# Patient Record
Sex: MALE | Race: WHITE | NOT HISPANIC OR LATINO
[De-identification: names, ages, dates, MRNs, and addresses within clinical notes are randomized per-mention and may not be internally consistent; named-entity substitution may affect disease eponyms.]

---

## 2017-01-20 ENCOUNTER — RX RENEWAL (OUTPATIENT)
Age: 47
End: 2017-01-20

## 2017-04-04 ENCOUNTER — APPOINTMENT (OUTPATIENT)
Dept: RHEUMATOLOGY | Facility: CLINIC | Age: 47
End: 2017-04-04

## 2017-05-05 ENCOUNTER — RX RENEWAL (OUTPATIENT)
Age: 47
End: 2017-05-05

## 2017-06-12 ENCOUNTER — RX RENEWAL (OUTPATIENT)
Age: 47
End: 2017-06-12

## 2017-07-21 ENCOUNTER — APPOINTMENT (OUTPATIENT)
Dept: RHEUMATOLOGY | Facility: CLINIC | Age: 47
End: 2017-07-21

## 2017-08-24 ENCOUNTER — APPOINTMENT (OUTPATIENT)
Dept: NEUROSURGERY | Facility: CLINIC | Age: 47
End: 2017-08-24
Payer: COMMERCIAL

## 2017-08-24 DIAGNOSIS — R09.81 NASAL CONGESTION: ICD-10-CM

## 2017-08-24 PROCEDURE — 99203 OFFICE O/P NEW LOW 30 MIN: CPT

## 2017-09-01 PROBLEM — R09.81 CONGESTION OF NASAL SINUS: Status: ACTIVE | Noted: 2017-08-24

## 2017-10-30 ENCOUNTER — FORM ENCOUNTER (OUTPATIENT)
Age: 47
End: 2017-10-30

## 2017-10-31 ENCOUNTER — APPOINTMENT (OUTPATIENT)
Dept: MRI IMAGING | Facility: CLINIC | Age: 47
End: 2017-10-31
Payer: COMMERCIAL

## 2017-10-31 ENCOUNTER — OUTPATIENT (OUTPATIENT)
Dept: OUTPATIENT SERVICES | Facility: HOSPITAL | Age: 47
LOS: 1 days | End: 2017-10-31
Payer: COMMERCIAL

## 2017-10-31 DIAGNOSIS — Z00.8 ENCOUNTER FOR OTHER GENERAL EXAMINATION: ICD-10-CM

## 2017-10-31 PROCEDURE — 72148 MRI LUMBAR SPINE W/O DYE: CPT | Mod: 26

## 2017-10-31 PROCEDURE — 72141 MRI NECK SPINE W/O DYE: CPT | Mod: 26

## 2017-10-31 PROCEDURE — 72148 MRI LUMBAR SPINE W/O DYE: CPT

## 2017-10-31 PROCEDURE — 72141 MRI NECK SPINE W/O DYE: CPT

## 2017-12-12 ENCOUNTER — APPOINTMENT (OUTPATIENT)
Dept: NEUROSURGERY | Facility: CLINIC | Age: 47
End: 2017-12-12
Payer: COMMERCIAL

## 2017-12-12 VITALS
BODY MASS INDEX: 25.77 KG/M2 | DIASTOLIC BLOOD PRESSURE: 91 MMHG | WEIGHT: 180 LBS | SYSTOLIC BLOOD PRESSURE: 134 MMHG | RESPIRATION RATE: 16 BRPM | TEMPERATURE: 98 F | HEART RATE: 76 BPM | HEIGHT: 70 IN

## 2017-12-12 DIAGNOSIS — M54.41 LUMBAGO WITH SCIATICA, LEFT SIDE: ICD-10-CM

## 2017-12-12 DIAGNOSIS — M54.42 LUMBAGO WITH SCIATICA, LEFT SIDE: ICD-10-CM

## 2017-12-12 DIAGNOSIS — M51.16 INTERVERTEBRAL DISC DISORDERS WITH RADICULOPATHY, LUMBAR REGION: ICD-10-CM

## 2017-12-12 PROCEDURE — 99214 OFFICE O/P EST MOD 30 MIN: CPT

## 2017-12-22 PROBLEM — M51.16 LUMBAR DISC PROLAPSE WITH COMPRESSION RADICULOPATHY: Status: ACTIVE | Noted: 2017-12-22

## 2018-06-07 ENCOUNTER — APPOINTMENT (OUTPATIENT)
Dept: RHEUMATOLOGY | Facility: CLINIC | Age: 48
End: 2018-06-07
Payer: COMMERCIAL

## 2018-06-07 VITALS
HEIGHT: 70 IN | BODY MASS INDEX: 25.05 KG/M2 | OXYGEN SATURATION: 98 % | HEART RATE: 72 BPM | SYSTOLIC BLOOD PRESSURE: 126 MMHG | DIASTOLIC BLOOD PRESSURE: 80 MMHG | WEIGHT: 175 LBS

## 2018-06-07 DIAGNOSIS — G56.03 CARPAL TUNNEL SYNDROM,BILATERAL UPPER LIMBS: ICD-10-CM

## 2018-06-07 DIAGNOSIS — E78.00 PURE HYPERCHOLESTEROLEMIA, UNSPECIFIED: ICD-10-CM

## 2018-06-07 DIAGNOSIS — S16.1XXA STRAIN OF MUSCLE, FASCIA AND TENDON AT NECK LEVEL, INITIAL ENCOUNTER: ICD-10-CM

## 2018-06-07 DIAGNOSIS — E55.9 VITAMIN D DEFICIENCY, UNSPECIFIED: ICD-10-CM

## 2018-06-07 PROCEDURE — 99214 OFFICE O/P EST MOD 30 MIN: CPT

## 2018-06-07 RX ORDER — AMLODIPINE BESYLATE 5 MG/1
5 TABLET ORAL
Qty: 30 | Refills: 0 | Status: ACTIVE | COMMUNITY
Start: 2017-10-25

## 2018-07-17 ENCOUNTER — APPOINTMENT (OUTPATIENT)
Dept: RHEUMATOLOGY | Facility: CLINIC | Age: 48
End: 2018-07-17

## 2018-10-23 ENCOUNTER — APPOINTMENT (OUTPATIENT)
Dept: RHEUMATOLOGY | Facility: CLINIC | Age: 48
End: 2018-10-23
Payer: COMMERCIAL

## 2018-10-23 VITALS
DIASTOLIC BLOOD PRESSURE: 91 MMHG | HEART RATE: 69 BPM | OXYGEN SATURATION: 99 % | BODY MASS INDEX: 24.34 KG/M2 | WEIGHT: 170 LBS | SYSTOLIC BLOOD PRESSURE: 143 MMHG | HEIGHT: 70 IN

## 2018-10-23 DIAGNOSIS — G47.30 SLEEP APNEA, UNSPECIFIED: ICD-10-CM

## 2018-10-23 DIAGNOSIS — R74.8 ABNORMAL LEVELS OF OTHER SERUM ENZYMES: ICD-10-CM

## 2018-10-23 PROCEDURE — 99214 OFFICE O/P EST MOD 30 MIN: CPT

## 2018-10-23 RX ORDER — TIZANIDINE 4 MG/1
4 TABLET ORAL
Qty: 90 | Refills: 0 | Status: DISCONTINUED | COMMUNITY
Start: 2018-06-07 | End: 2018-10-23

## 2018-10-23 RX ORDER — NAPROXEN 500 MG/1
500 TABLET ORAL
Qty: 14 | Refills: 0 | Status: DISCONTINUED | COMMUNITY
Start: 2018-06-04 | End: 2018-10-23

## 2018-10-24 LAB — CK SERPL-CCNC: 371 U/L

## 2018-10-25 LAB — HLA-B27 RELATED AG QL: NORMAL

## 2018-10-30 ENCOUNTER — CLINICAL ADVICE (OUTPATIENT)
Age: 48
End: 2018-10-30

## 2018-11-09 ENCOUNTER — FORM ENCOUNTER (OUTPATIENT)
Age: 48
End: 2018-11-09

## 2018-11-10 ENCOUNTER — APPOINTMENT (OUTPATIENT)
Dept: RADIOLOGY | Facility: CLINIC | Age: 48
End: 2018-11-10
Payer: COMMERCIAL

## 2018-11-10 ENCOUNTER — OUTPATIENT (OUTPATIENT)
Dept: OUTPATIENT SERVICES | Facility: HOSPITAL | Age: 48
LOS: 1 days | End: 2018-11-10
Payer: COMMERCIAL

## 2018-11-10 DIAGNOSIS — Z00.8 ENCOUNTER FOR OTHER GENERAL EXAMINATION: ICD-10-CM

## 2018-11-10 PROCEDURE — 73030 X-RAY EXAM OF SHOULDER: CPT

## 2018-11-10 PROCEDURE — 73030 X-RAY EXAM OF SHOULDER: CPT | Mod: 26,50

## 2018-11-10 PROCEDURE — 73564 X-RAY EXAM KNEE 4 OR MORE: CPT | Mod: 26,50

## 2018-11-10 PROCEDURE — 73564 X-RAY EXAM KNEE 4 OR MORE: CPT

## 2018-11-27 ENCOUNTER — RX RENEWAL (OUTPATIENT)
Age: 48
End: 2018-11-27

## 2018-11-27 DIAGNOSIS — M45.9 ANKYLOSING SPONDYLITIS OF UNSPECIFIED SITES IN SPINE: ICD-10-CM

## 2019-01-04 ENCOUNTER — RESULT REVIEW (OUTPATIENT)
Age: 49
End: 2019-01-04

## 2019-01-23 ENCOUNTER — APPOINTMENT (OUTPATIENT)
Dept: RHEUMATOLOGY | Facility: CLINIC | Age: 49
End: 2019-01-23

## 2019-02-28 ENCOUNTER — RX RENEWAL (OUTPATIENT)
Age: 49
End: 2019-02-28

## 2019-06-24 ENCOUNTER — RX RENEWAL (OUTPATIENT)
Age: 49
End: 2019-06-24

## 2019-09-30 ENCOUNTER — RX RENEWAL (OUTPATIENT)
Age: 49
End: 2019-09-30

## 2019-11-15 ENCOUNTER — RX RENEWAL (OUTPATIENT)
Age: 49
End: 2019-11-15

## 2019-11-19 ENCOUNTER — RX RENEWAL (OUTPATIENT)
Age: 49
End: 2019-11-19

## 2020-01-03 ENCOUNTER — APPOINTMENT (OUTPATIENT)
Dept: RHEUMATOLOGY | Facility: CLINIC | Age: 50
End: 2020-01-03
Payer: COMMERCIAL

## 2020-01-03 VITALS
BODY MASS INDEX: 24.77 KG/M2 | DIASTOLIC BLOOD PRESSURE: 84 MMHG | SYSTOLIC BLOOD PRESSURE: 132 MMHG | WEIGHT: 173 LBS | HEART RATE: 71 BPM | HEIGHT: 70 IN | OXYGEN SATURATION: 99 %

## 2020-01-03 DIAGNOSIS — M54.12 RADICULOPATHY, CERVICAL REGION: ICD-10-CM

## 2020-01-03 DIAGNOSIS — Z79.1 LONG TERM (CURRENT) USE OF NON-STEROIDAL ANTI-INFLAMMATORIES (NSAID): ICD-10-CM

## 2020-01-03 DIAGNOSIS — G47.30 SLEEP APNEA, UNSPECIFIED: ICD-10-CM

## 2020-01-03 DIAGNOSIS — M25.50 PAIN IN UNSPECIFIED JOINT: ICD-10-CM

## 2020-01-03 PROCEDURE — 99214 OFFICE O/P EST MOD 30 MIN: CPT

## 2020-01-03 RX ORDER — DICLOFENAC SODIUM 50 MG/1
50 TABLET, DELAYED RELEASE ORAL
Qty: 90 | Refills: 1 | Status: ACTIVE | COMMUNITY
Start: 2018-11-27 | End: 1900-01-01

## 2020-01-03 NOTE — ASSESSMENT
[FreeTextEntry1] : 49-year-old male presents for followup today. His main complaint is polyarthralgias, he has an underlying diagnosis of mild sleep apnea. He has also been noted to have mildly elevated CPKs in the past. He has been seronegative in the past. He has underlying cervical radiculopathy which is manageable for the most part.\par \par Polyarthralgia-\par -The most likely dx is OA\par - he uses indomethacin 75 mg qd\par - to try 75 mg alternating with 50 mg, if he is able to tolerate will lower to 50 mg qd\par \par Elevated CPKs-\par -mildly elevated, they have been stable for the patient.\par -He has intact muscle strength today without any weakness.\par \par Sleep apnea-\par -has been diagnosed and confirmed.\par -He could not tolerate CPAP machine.\par -Recommend trying the mouth guard.\par \par Cervical radiculopathy-\par -stable at this time\par -Recommend stretching and gentle weight training as tolerated\par \par \par NSAID use\par Risks and benefits of NSAIDS were d/w patient including but not limited to hypertension, nephrotoxicity, GI intolerance , increased risk for thromboembolism, CHF, hepatotoxicity and anemia.\par To have labs done\par Followup 6 months. He is aware to call if his symptoms worsen.

## 2020-01-03 NOTE — PHYSICAL EXAM
[General Appearance - Alert] : alert [General Appearance - In No Acute Distress] : in no acute distress [General Appearance - Well-Appearing] : healthy appearing [General Appearance - Well Nourished] : well nourished [General Appearance - Well Developed] : well developed [PERRL With Normal Accommodation] : pupils were equal in size, round, and reactive to light [Extraocular Movements] : extraocular movements were intact [Sclera] : the sclera and conjunctiva were normal [Oropharynx] : the oropharynx was normal [Outer Ear] : the ears and nose were normal in appearance [Neck Cervical Mass (___cm)] : no neck mass was observed [Jugular Venous Distention Increased] : there was no jugular-venous distention [Neck Appearance] : the appearance of the neck was normal [Thyroid Diffuse Enlargement] : the thyroid was not enlarged [Thyroid Nodule] : there were no palpable thyroid nodules [Auscultation Breath Sounds / Voice Sounds] : lungs were clear to auscultation bilaterally [Heart Rate And Rhythm] : heart rate was normal and rhythm regular [Heart Sounds Pericardial Friction Rub] : no pericardial rub [Heart Sounds Gallop] : no gallops [Heart Sounds] : normal S1 and S2 [Murmurs] : no murmurs [Bowel Sounds] : normal bowel sounds [Edema] : there was no peripheral edema [Abdomen Mass (___ Cm)] : no abdominal mass palpated [Abdomen Soft] : soft [Abdomen Tenderness] : non-tender [Cervical Lymph Nodes Enlarged Posterior Bilaterally] : posterior cervical [Cervical Lymph Nodes Enlarged Anterior Bilaterally] : anterior cervical [Supraclavicular Lymph Nodes Enlarged Bilaterally] : supraclavicular [No Spinal Tenderness] : no spinal tenderness [No CVA Tenderness] : no ~M costovertebral angle tenderness [Nail Clubbing] : no clubbing  or cyanosis of the fingernails [Abnormal Walk] : normal gait [Musculoskeletal - Swelling] : no joint swelling seen [Motor Tone] : muscle strength and tone were normal [Skin Color & Pigmentation] : normal skin color and pigmentation [] : no rash [Skin Turgor] : normal skin turgor [FreeTextEntry1] : no overt weakness in upper extr but limited assessment due to R lateral epicondyle tenderness; numbness tip R1

## 2020-01-03 NOTE — HISTORY OF PRESENT ILLNESS
[FreeTextEntry1] : Followup joint pains 1 year\par He is using indomethacin 75 mg qd when he ran out of it he had a lot of pain. \par \par He complains of pain in his shoulders and knees. The shoulders are the worst joints. He has a high stress job and works at least 50-60 hours a week\par He recalls having a diagnosis of ankylosing spondylitis from the neurosurgeon. He does not remember any treatment for it or whether he was HLA-B27 positive. In our office otherwise he has been seronegative. He has some chronic numbness and tingling in his right hand which has been attributed to a pinched nerve in his neck. He denies psoriasis or colitis.\par \par He has been diagnosed with mild sleep apnea but could not tolerate the CPAP machine. \par \par He has a good appetite and denies weight loss. He denies fevers chills or night sweats. He rates his current symptoms at a 5/10. he has concerns about long term NSAID use

## 2020-01-23 ENCOUNTER — RX RENEWAL (OUTPATIENT)
Age: 50
End: 2020-01-23

## 2020-05-27 ENCOUNTER — RX RENEWAL (OUTPATIENT)
Age: 50
End: 2020-05-27

## 2020-05-27 RX ORDER — DICLOFENAC SODIUM 75 MG/1
75 TABLET, DELAYED RELEASE ORAL
Qty: 30 | Refills: 1 | Status: ACTIVE | COMMUNITY
Start: 2020-01-23 | End: 1900-01-01

## 2020-07-06 ENCOUNTER — APPOINTMENT (OUTPATIENT)
Dept: RHEUMATOLOGY | Facility: CLINIC | Age: 50
End: 2020-07-06

## 2022-05-02 ENCOUNTER — NON-APPOINTMENT (OUTPATIENT)
Age: 52
End: 2022-05-02